# Patient Record
Sex: FEMALE | Race: BLACK OR AFRICAN AMERICAN | NOT HISPANIC OR LATINO | Employment: FULL TIME | URBAN - METROPOLITAN AREA
[De-identification: names, ages, dates, MRNs, and addresses within clinical notes are randomized per-mention and may not be internally consistent; named-entity substitution may affect disease eponyms.]

---

## 2023-10-06 PROBLEM — I86.4 VARICES, GASTRIC: Status: ACTIVE | Noted: 2023-10-06

## 2023-10-06 PROBLEM — D21.9 FIBROIDS: Status: ACTIVE | Noted: 2023-10-06

## 2023-10-06 PROBLEM — R20.0 NUMBNESS AND TINGLING: Status: ACTIVE | Noted: 2023-10-06

## 2023-10-06 PROBLEM — N92.0 MENORRHAGIA: Status: ACTIVE | Noted: 2023-10-06

## 2023-10-06 PROBLEM — E61.1 IRON DEFICIENCY: Status: ACTIVE | Noted: 2023-10-06

## 2023-10-06 PROBLEM — N87.9 CERVICAL DYSPLASIA: Status: ACTIVE | Noted: 2023-10-06

## 2023-10-06 PROBLEM — R92.8 ABNORMAL MAMMOGRAM: Status: ACTIVE | Noted: 2023-10-06

## 2023-10-06 PROBLEM — H53.9 VISION DISTURBANCE: Status: ACTIVE | Noted: 2023-10-06

## 2023-10-06 PROBLEM — Z87.42 HISTORY OF ABNORMAL CERVICAL PAP SMEAR: Status: ACTIVE | Noted: 2023-10-06

## 2023-10-06 PROBLEM — R76.8 POSITIVE LYME DISEASE SEROLOGY: Status: ACTIVE | Noted: 2023-10-06

## 2023-10-06 PROBLEM — F41.9 ANXIETY: Status: ACTIVE | Noted: 2023-10-06

## 2023-10-06 PROBLEM — R20.2 NUMBNESS AND TINGLING: Status: ACTIVE | Noted: 2023-10-06

## 2023-10-06 PROBLEM — K70.30 CIRRHOSIS, ALCOHOLIC (MULTI): Status: ACTIVE | Noted: 2023-10-06

## 2023-10-06 PROBLEM — R00.2 PALPITATIONS: Status: ACTIVE | Noted: 2023-10-06

## 2023-10-06 PROBLEM — K76.9 LIVER LESION: Status: ACTIVE | Noted: 2023-10-06

## 2023-10-06 PROBLEM — R79.89 ELEVATED LFTS: Status: ACTIVE | Noted: 2023-10-06

## 2023-10-06 PROBLEM — R93.2 ABNORMAL LIVER SCAN: Status: ACTIVE | Noted: 2023-10-06

## 2023-10-06 PROBLEM — S06.6XAA TRAUMATIC SUBARACHNOID HEMORRHAGE (MULTI): Status: ACTIVE | Noted: 2023-10-06

## 2023-10-06 PROBLEM — R73.9 HYPERGLYCEMIA: Status: ACTIVE | Noted: 2023-10-06

## 2023-10-06 PROBLEM — R30.0 DYSURIA: Status: ACTIVE | Noted: 2023-10-06

## 2023-10-06 PROBLEM — I10 BENIGN HYPERTENSION: Status: ACTIVE | Noted: 2023-10-06

## 2023-10-06 PROBLEM — E53.8 FOLIC ACID DEFICIENCY: Status: ACTIVE | Noted: 2023-10-06

## 2023-10-06 PROBLEM — G60.9 IDIOPATHIC PERIPHERAL NEUROPATHY: Status: ACTIVE | Noted: 2023-10-06

## 2023-10-06 PROBLEM — D64.9 ANEMIA: Status: ACTIVE | Noted: 2023-10-06

## 2023-10-06 PROBLEM — A63.0 PERIANAL CONDYLOMATA: Status: ACTIVE | Noted: 2023-10-06

## 2023-10-06 PROBLEM — S06.5XAA SUBDURAL HEMATOMA (MULTI): Status: ACTIVE | Noted: 2023-10-06

## 2023-10-06 PROBLEM — K70.10 ALCOHOLIC HEPATITIS (MULTI): Status: ACTIVE | Noted: 2023-10-06

## 2023-10-06 PROBLEM — E53.1 VITAMIN B6 DEFICIENCY: Status: ACTIVE | Noted: 2023-10-06

## 2023-10-06 PROBLEM — E03.9 HYPOTHYROIDISM: Status: ACTIVE | Noted: 2023-10-06

## 2023-10-06 RX ORDER — OXYCODONE HYDROCHLORIDE 5 MG/1
5 CAPSULE ORAL EVERY 6 HOURS PRN
COMMUNITY
Start: 2020-12-09 | End: 2023-10-09 | Stop reason: HOSPADM

## 2023-10-06 RX ORDER — GABAPENTIN 300 MG/1
600 CAPSULE ORAL NIGHTLY
COMMUNITY
Start: 2020-08-24 | End: 2023-10-09 | Stop reason: HOSPADM

## 2023-10-06 RX ORDER — ACETAMINOPHEN 325 MG/1
2 TABLET ORAL
COMMUNITY
Start: 2020-12-09

## 2023-10-06 RX ORDER — PODOFILOX 5 MG/G
GEL TOPICAL
COMMUNITY
Start: 2022-09-12

## 2023-10-09 ENCOUNTER — LAB (OUTPATIENT)
Dept: LAB | Facility: LAB | Age: 48
End: 2023-10-09
Payer: COMMERCIAL

## 2023-10-09 ENCOUNTER — OFFICE VISIT (OUTPATIENT)
Dept: OBSTETRICS AND GYNECOLOGY | Facility: CLINIC | Age: 48
End: 2023-10-09
Payer: COMMERCIAL

## 2023-10-09 VITALS
BODY MASS INDEX: 25.49 KG/M2 | SYSTOLIC BLOOD PRESSURE: 135 MMHG | WEIGHT: 153 LBS | HEIGHT: 65 IN | DIASTOLIC BLOOD PRESSURE: 85 MMHG

## 2023-10-09 DIAGNOSIS — Z11.3 SCREENING EXAMINATION FOR STD (SEXUALLY TRANSMITTED DISEASE): ICD-10-CM

## 2023-10-09 DIAGNOSIS — Z11.3 SCREENING EXAMINATION FOR STD (SEXUALLY TRANSMITTED DISEASE): Primary | ICD-10-CM

## 2023-10-09 DIAGNOSIS — Z01.419 ENCOUNTER FOR ANNUAL ROUTINE GYNECOLOGICAL EXAMINATION: ICD-10-CM

## 2023-10-09 LAB
BLOOD URINE, POC: NEGATIVE
GLUCOSE URINE, POC: NEGATIVE
HCV AB SER QL: NONREACTIVE
HIV 1+2 AB+HIV1 P24 AG SERPL QL IA: NONREACTIVE
KETONES, POC: NEGATIVE
LEUKOCYTE EST, POC: NEGATIVE
NITRITE, POC: NEGATIVE
T PALLIDUM AB SER QL: NONREACTIVE
URINE PROTEIN, POC: NEGATIVE

## 2023-10-09 PROCEDURE — 87389 HIV-1 AG W/HIV-1&-2 AB AG IA: CPT

## 2023-10-09 PROCEDURE — 99396 PREV VISIT EST AGE 40-64: CPT | Performed by: OBSTETRICS & GYNECOLOGY

## 2023-10-09 PROCEDURE — 3075F SYST BP GE 130 - 139MM HG: CPT | Performed by: OBSTETRICS & GYNECOLOGY

## 2023-10-09 PROCEDURE — 36415 COLL VENOUS BLD VENIPUNCTURE: CPT

## 2023-10-09 PROCEDURE — 87800 DETECT AGNT MULT DNA DIREC: CPT

## 2023-10-09 PROCEDURE — 3079F DIAST BP 80-89 MM HG: CPT | Performed by: OBSTETRICS & GYNECOLOGY

## 2023-10-09 PROCEDURE — 86780 TREPONEMA PALLIDUM: CPT

## 2023-10-09 PROCEDURE — 87661 TRICHOMONAS VAGINALIS AMPLIF: CPT

## 2023-10-09 PROCEDURE — 1036F TOBACCO NON-USER: CPT | Performed by: OBSTETRICS & GYNECOLOGY

## 2023-10-09 PROCEDURE — 87340 HEPATITIS B SURFACE AG IA: CPT

## 2023-10-09 PROCEDURE — 86803 HEPATITIS C AB TEST: CPT

## 2023-10-09 ASSESSMENT — PAIN SCALES - GENERAL: PAINLEVEL: 0-NO PAIN

## 2023-10-09 NOTE — PROGRESS NOTES
"Raquel Ruiz is a 48 y.o. female who is here for a routine exam. PCP = Taran Wild MD    Review of Systems    Review of systems is negative    Physical Exam  Constitutional:       Appearance: Normal appearance.   Genitourinary:      Rectum normal.      Genitourinary Comments: Patient is status post hysterectomy absent cervix uterus.  Adnexa unremarkable vagina clear external genitalia normal      Uterus is anteverted.   Breasts:     Breasts are soft.     Right: Normal.      Left: Normal.   HENT:      Head: Normocephalic.      Nose: Nose normal.   Eyes:      Pupils: Pupils are equal, round, and reactive to light.   Cardiovascular:      Rate and Rhythm: Normal rate and regular rhythm.   Pulmonary:      Effort: Pulmonary effort is normal.      Breath sounds: Normal breath sounds.   Abdominal:      General: Abdomen is flat. Bowel sounds are normal.      Palpations: Abdomen is soft.   Musculoskeletal:         General: Normal range of motion.      Cervical back: Normal range of motion and neck supple.   Neurological:      General: No focal deficit present.      Mental Status: She is alert.   Skin:     General: Skin is warm and dry.   Psychiatric:         Mood and Affect: Mood normal.         Behavior: Behavior normal.         Thought Content: Thought content normal.         Judgment: Judgment normal.           Objective   /85   Ht 1.651 m (5' 5\")   Wt 69.4 kg (153 lb)   BMI 25.46 kg/m²   OB History          2    Para   1    Term                AB        Living             SAB        IAB        Ectopic        Multiple        Live Births                      GynHx:  Menarche/Menopause: Status post hysterectomy      Social History     Substance and Sexual Activity   Sexual Activity Not on file     :   STIs: none    Substance:   Tobacco Use: Low Risk  (10/9/2023)    Patient History     Smoking Tobacco Use: Never     Smokeless Tobacco Use: Never     Passive Exposure: Not on file      Social History "     Substance and Sexual Activity   Drug Use Never      Social History     Substance and Sexual Activity   Alcohol Use None    Comment: Ocassional     Abuse: No       Past med hx and past surg hx reviewed and notable for: Status post hysterectomy        Assessment/Plan      Unremarkable annual GYN exam.  Patient is sexually active occasionally.  Status post hysterectomy.  Discussed diet exercise calcium and vitamin D.  Will order mammogram.  Patient believes she had a bone density done at the [facility] unable to find records but she will check into it.  Routine asked STD testing done.  Return to office 1 year or as needed

## 2023-10-10 LAB
C TRACH RRNA SPEC QL NAA+PROBE: NEGATIVE
HBV SURFACE AG SERPL QL IA: NONREACTIVE
N GONORRHOEA DNA SPEC QL PROBE+SIG AMP: NEGATIVE
T VAGINALIS RRNA SPEC QL NAA+PROBE: NEGATIVE

## 2023-11-14 ENCOUNTER — ANCILLARY PROCEDURE (OUTPATIENT)
Dept: RADIOLOGY | Facility: CLINIC | Age: 48
End: 2023-11-14
Payer: COMMERCIAL

## 2023-11-14 DIAGNOSIS — Z01.419 ENCOUNTER FOR ANNUAL ROUTINE GYNECOLOGICAL EXAMINATION: ICD-10-CM

## 2023-11-14 PROCEDURE — 77067 SCR MAMMO BI INCL CAD: CPT

## 2023-11-14 PROCEDURE — 77067 SCR MAMMO BI INCL CAD: CPT | Performed by: RADIOLOGY

## 2023-11-14 PROCEDURE — 77063 BREAST TOMOSYNTHESIS BI: CPT | Performed by: RADIOLOGY

## 2024-09-16 ENCOUNTER — LAB (OUTPATIENT)
Dept: LAB | Facility: LAB | Age: 49
End: 2024-09-16
Payer: COMMERCIAL

## 2024-09-16 ENCOUNTER — APPOINTMENT (OUTPATIENT)
Dept: OBSTETRICS AND GYNECOLOGY | Facility: CLINIC | Age: 49
End: 2024-09-16
Payer: COMMERCIAL

## 2024-09-16 VITALS
DIASTOLIC BLOOD PRESSURE: 122 MMHG | BODY MASS INDEX: 24.99 KG/M2 | HEIGHT: 65 IN | WEIGHT: 150 LBS | SYSTOLIC BLOOD PRESSURE: 196 MMHG

## 2024-09-16 DIAGNOSIS — Z12.31 ENCOUNTER FOR SCREENING MAMMOGRAM FOR MALIGNANT NEOPLASM OF BREAST: ICD-10-CM

## 2024-09-16 DIAGNOSIS — Z01.419 ENCOUNTER FOR WELL WOMAN EXAM WITH ROUTINE GYNECOLOGICAL EXAM: Primary | ICD-10-CM

## 2024-09-16 DIAGNOSIS — R03.0 ELEVATED BLOOD PRESSURE READING: ICD-10-CM

## 2024-09-16 DIAGNOSIS — Z11.3 SCREENING EXAMINATION FOR STD (SEXUALLY TRANSMITTED DISEASE): ICD-10-CM

## 2024-09-16 LAB
HBV SURFACE AG SERPL QL IA: NONREACTIVE
HCV AB SER QL: NONREACTIVE
HIV 1+2 AB+HIV1 P24 AG SERPL QL IA: NONREACTIVE
TREPONEMA PALLIDUM IGG+IGM AB [PRESENCE] IN SERUM OR PLASMA BY IMMUNOASSAY: NONREACTIVE

## 2024-09-16 PROCEDURE — 86803 HEPATITIS C AB TEST: CPT

## 2024-09-16 PROCEDURE — 87389 HIV-1 AG W/HIV-1&-2 AB AG IA: CPT

## 2024-09-16 PROCEDURE — 86780 TREPONEMA PALLIDUM: CPT

## 2024-09-16 PROCEDURE — 3008F BODY MASS INDEX DOCD: CPT | Performed by: OBSTETRICS & GYNECOLOGY

## 2024-09-16 PROCEDURE — 3080F DIAST BP >= 90 MM HG: CPT | Performed by: OBSTETRICS & GYNECOLOGY

## 2024-09-16 PROCEDURE — 3077F SYST BP >= 140 MM HG: CPT | Performed by: OBSTETRICS & GYNECOLOGY

## 2024-09-16 PROCEDURE — 99396 PREV VISIT EST AGE 40-64: CPT | Performed by: OBSTETRICS & GYNECOLOGY

## 2024-09-16 PROCEDURE — 99213 OFFICE O/P EST LOW 20 MIN: CPT | Performed by: OBSTETRICS & GYNECOLOGY

## 2024-09-16 PROCEDURE — 87340 HEPATITIS B SURFACE AG IA: CPT

## 2024-09-16 PROCEDURE — 36415 COLL VENOUS BLD VENIPUNCTURE: CPT

## 2024-09-16 PROCEDURE — 1036F TOBACCO NON-USER: CPT | Performed by: OBSTETRICS & GYNECOLOGY

## 2024-09-16 ASSESSMENT — ENCOUNTER SYMPTOMS
RESPIRATORY NEGATIVE: 0
MUSCULOSKELETAL NEGATIVE: 0
ENDOCRINE NEGATIVE: 0
EYES NEGATIVE: 0
GASTROINTESTINAL NEGATIVE: 0
CONSTITUTIONAL NEGATIVE: 0
NEUROLOGICAL NEGATIVE: 0
PSYCHIATRIC NEGATIVE: 0
CARDIOVASCULAR NEGATIVE: 0
ALLERGIC/IMMUNOLOGIC NEGATIVE: 0
HEMATOLOGIC/LYMPHATIC NEGATIVE: 0

## 2024-09-16 ASSESSMENT — PATIENT HEALTH QUESTIONNAIRE - PHQ9
1. LITTLE INTEREST OR PLEASURE IN DOING THINGS: NOT AT ALL
2. FEELING DOWN, DEPRESSED OR HOPELESS: NOT AT ALL
SUM OF ALL RESPONSES TO PHQ9 QUESTIONS 1 AND 2: 0

## 2024-09-16 ASSESSMENT — PAIN SCALES - GENERAL: PAINLEVEL: 0-NO PAIN

## 2024-09-16 NOTE — PROGRESS NOTES
"Raquel Ruiz is a 49 y.o. female who is here for a routine exam. PCP = Taran Wild MD  Patient for annual exam no complaints.  Sexually active with steady partner.  Status post hysterectomy    Review of Systems  All systems negative    Physical Exam  Constitutional:       Appearance: Normal appearance.   Genitourinary:      Genitourinary Comments: External genitalia unremarkable  Vagina clear cuff intact  Cervix and uterus surgically absent  Adnexa masses or tenderness  Perineum without lesions or swelling    Breast without masses tenderness or nipple discharge, normal appearance   Breasts:     Breasts are soft.     Right: Normal.      Left: Normal.   HENT:      Head: Normocephalic.      Nose: Nose normal.   Eyes:      Pupils: Pupils are equal, round, and reactive to light.   Cardiovascular:      Rate and Rhythm: Normal rate and regular rhythm.   Pulmonary:      Effort: Pulmonary effort is normal.      Breath sounds: Normal breath sounds.   Abdominal:      General: Abdomen is flat. Bowel sounds are normal.      Palpations: Abdomen is soft.   Musculoskeletal:         General: Normal range of motion.      Cervical back: Normal range of motion and neck supple.   Neurological:      General: No focal deficit present.      Mental Status: She is alert.   Skin:     General: Skin is warm and dry.   Psychiatric:         Mood and Affect: Mood normal.         Behavior: Behavior normal.         Thought Content: Thought content normal.         Judgment: Judgment normal.   Vitals reviewed.     Objective   BP (!) 196/122   Ht 1.651 m (5' 5\")   Wt 68 kg (150 lb)   BMI 24.96 kg/m²   OB History          2    Para   1    Term                AB        Living   1         SAB        IAB        Ectopic        Multiple        Live Births   1                GynHx:  Menarche/Menopause: 11/hysterectomy    Social History     Substance and Sexual Activity   Sexual Activity Yes    Partners: Male    Birth control/protection: " None, Female Sterilization     STIs: none    Substance:   Tobacco Use: Low Risk  (9/16/2024)    Patient History     Smoking Tobacco Use: Never     Smokeless Tobacco Use: Never     Passive Exposure: Not on file      Social History     Substance and Sexual Activity   Drug Use Never      Social History     Substance and Sexual Activity   Alcohol Use Yes    Alcohol/week: 2.0 standard drinks of alcohol    Types: 2 Standard drinks or equivalent per week    Comment: Ocassional     Abuse: No  Depression Screen:   Depression    Past med hx and past surg hx reviewed and notable for: Hysterectomy, chronic hypertension    Assessment/Plan      Clinically unremarkable GYN exam.  Sexually active with steady partner declining STD testing.  Patient is status post hysterectomy.  Discussed diet exercise calcium and vitamin D.    Patient has markedly elevated blood pressure in the office 196/122.  Declining repeat.  Notes that she has have off-and-on issues with blood pressure and has been treated in the past.  Currently has no primary care physician.  Has machine at home and will recheck it today.  Strongly encourage patient to go to the emergency room as she will not be able to get into see a primary care physician in the immediate future.  Patient declining.  Discussed risks to health of elevated blood pressure and could be critical problem at this point patient understands and will check her blood pressure.  Will go to emergency room if still elevated

## 2024-09-17 ENCOUNTER — LAB (OUTPATIENT)
Dept: LAB | Facility: LAB | Age: 49
End: 2024-09-17
Payer: COMMERCIAL

## 2024-09-17 DIAGNOSIS — Z11.3 SCREENING EXAMINATION FOR STD (SEXUALLY TRANSMITTED DISEASE): ICD-10-CM

## 2024-09-17 PROCEDURE — 87591 N.GONORRHOEAE DNA AMP PROB: CPT

## 2024-09-17 PROCEDURE — 87661 TRICHOMONAS VAGINALIS AMPLIF: CPT

## 2024-09-17 PROCEDURE — 87491 CHLMYD TRACH DNA AMP PROBE: CPT

## 2024-09-18 LAB
C TRACH RRNA SPEC QL NAA+PROBE: NEGATIVE
N GONORRHOEA DNA SPEC QL PROBE+SIG AMP: NEGATIVE
T VAGINALIS RRNA SPEC QL NAA+PROBE: NEGATIVE

## 2024-10-14 ENCOUNTER — APPOINTMENT (OUTPATIENT)
Dept: PRIMARY CARE | Facility: CLINIC | Age: 49
End: 2024-10-14
Payer: COMMERCIAL

## 2024-11-04 ENCOUNTER — APPOINTMENT (OUTPATIENT)
Dept: PRIMARY CARE | Facility: CLINIC | Age: 49
End: 2024-11-04
Payer: COMMERCIAL

## 2024-11-14 ENCOUNTER — HOSPITAL ENCOUNTER (OUTPATIENT)
Dept: RADIOLOGY | Facility: CLINIC | Age: 49
Discharge: HOME | End: 2024-11-14
Payer: COMMERCIAL

## 2024-11-14 VITALS — HEIGHT: 65 IN | BODY MASS INDEX: 24.98 KG/M2 | WEIGHT: 149.91 LBS

## 2024-11-14 DIAGNOSIS — Z12.31 ENCOUNTER FOR SCREENING MAMMOGRAM FOR MALIGNANT NEOPLASM OF BREAST: ICD-10-CM

## 2024-11-14 PROCEDURE — 77067 SCR MAMMO BI INCL CAD: CPT

## 2025-03-22 DIAGNOSIS — N76.0 ACUTE VAGINITIS: Primary | ICD-10-CM

## 2025-03-22 RX ORDER — FLUCONAZOLE 150 MG/1
150 TABLET ORAL SEE ADMIN INSTRUCTIONS
Qty: 2 TABLET | Refills: 0 | Status: SHIPPED | OUTPATIENT
Start: 2025-03-22

## 2025-09-29 ENCOUNTER — APPOINTMENT (OUTPATIENT)
Dept: OBSTETRICS AND GYNECOLOGY | Facility: CLINIC | Age: 50
End: 2025-09-29
Payer: COMMERCIAL